# Patient Record
Sex: MALE | Race: OTHER | HISPANIC OR LATINO | URBAN - METROPOLITAN AREA
[De-identification: names, ages, dates, MRNs, and addresses within clinical notes are randomized per-mention and may not be internally consistent; named-entity substitution may affect disease eponyms.]

---

## 2017-10-07 ENCOUNTER — EMERGENCY (EMERGENCY)
Facility: HOSPITAL | Age: 22
LOS: 1 days | Discharge: HOME | End: 2017-10-07
Attending: EMERGENCY MEDICINE
Payer: MEDICAID

## 2017-10-07 VITALS
HEART RATE: 66 BPM | DIASTOLIC BLOOD PRESSURE: 72 MMHG | TEMPERATURE: 98 F | RESPIRATION RATE: 16 BRPM | SYSTOLIC BLOOD PRESSURE: 122 MMHG | OXYGEN SATURATION: 100 %

## 2017-10-07 VITALS
HEIGHT: 68 IN | HEART RATE: 58 BPM | DIASTOLIC BLOOD PRESSURE: 64 MMHG | OXYGEN SATURATION: 100 % | RESPIRATION RATE: 20 BRPM | TEMPERATURE: 98 F | WEIGHT: 199.96 LBS | SYSTOLIC BLOOD PRESSURE: 118 MMHG

## 2017-10-07 PROCEDURE — 12004 RPR S/N/AX/GEN/TRK7.6-12.5CM: CPT

## 2017-10-07 PROCEDURE — 12004 RPR S/N/AX/GEN/TRK7.6-12.5CM: CPT | Mod: RT

## 2017-10-07 PROCEDURE — 99284 EMERGENCY DEPT VISIT MOD MDM: CPT | Mod: 25

## 2017-10-07 PROCEDURE — 90715 TDAP VACCINE 7 YRS/> IM: CPT

## 2017-10-07 PROCEDURE — 99283 EMERGENCY DEPT VISIT LOW MDM: CPT | Mod: 25

## 2017-10-07 PROCEDURE — 90471 IMMUNIZATION ADMIN: CPT

## 2017-10-07 RX ORDER — IBUPROFEN 200 MG
1 TABLET ORAL
Qty: 20 | Refills: 0 | OUTPATIENT
Start: 2017-10-07

## 2017-10-07 RX ORDER — TETANUS TOXOID, REDUCED DIPHTHERIA TOXOID AND ACELLULAR PERTUSSIS VACCINE, ADSORBED 5; 2.5; 8; 8; 2.5 [IU]/.5ML; [IU]/.5ML; UG/.5ML; UG/.5ML; UG/.5ML
0.5 SUSPENSION INTRAMUSCULAR ONCE
Qty: 0 | Refills: 0 | Status: COMPLETED | OUTPATIENT
Start: 2017-10-07 | End: 2017-10-07

## 2017-10-07 RX ORDER — BACITRACIN ZINC 500 UNIT/G
1 OINTMENT IN PACKET (EA) TOPICAL
Qty: 1 | Refills: 0 | OUTPATIENT
Start: 2017-10-07 | End: 2017-10-12

## 2017-10-07 RX ADMIN — TETANUS TOXOID, REDUCED DIPHTHERIA TOXOID AND ACELLULAR PERTUSSIS VACCINE, ADSORBED 0.5 MILLILITER(S): 5; 2.5; 8; 8; 2.5 SUSPENSION INTRAMUSCULAR at 12:40

## 2017-10-07 NOTE — ED PROVIDER NOTE - MEDICAL DECISION MAKING DETAILS
Pt is neurovascularly intact sp suturing of hand laceration. Laceration is well approximated, pt/guardian refused plastics consult, aware of potential for scar formation.   Pt is well appearing walking with normal gait, stable for discharge and follow up with medical doctor. Pt educated on care and need for follow up. Discussed anticipatory guidance and return precautions. Questions answered. I had a detailed discussion with the patient and/or guardian regarding the historical points, exam findings, and any diagnostic results supporting the discharge diagnosis.. Pt will return in 10 days for suture removal.

## 2017-10-07 NOTE — ED PROVIDER NOTE - PHYSICAL EXAMINATION
9cm linear laceration from 2nd-3rd palmar web space extending down too proximal palmar region, distal radial and ulnar pulses intact, cap refill < 2 seconds, full range of motion of arm +elbow flexion/extension/supination and pronation intact, +flexion and extension of all digits at DIP and PIP.

## 2017-10-07 NOTE — ED PROCEDURE NOTE - PROCEDURE ADDITIONAL DETAILS
Laceration is well approximated, pt/guardian refused plastics consult, aware of potential for scar formation.

## 2017-10-07 NOTE — ED PROVIDER NOTE - OBJECTIVE STATEMENT
23 y/o M pt w/ no significant PMHx presents to the ED c/o laceration. Pt states that he was climbing a metal fence when he cut his hand. Denies numbness/tingling, weakness or any other complaints. NKDA.

## 2017-10-11 DIAGNOSIS — S61.411A LACERATION WITHOUT FOREIGN BODY OF RIGHT HAND, INITIAL ENCOUNTER: ICD-10-CM
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
## 2019-10-23 NOTE — ED ADULT TRIAGE NOTE - NS ED NOTE AC HIGH RISK COUNTRIES
What Type Of Note Output Would You Prefer (Optional)?: Standard Output Hpi Title: Evaluation of Skin Lesions Have Your Spot(S) Been Treated In The Past?: has not been treated Family Member: Father No

## 2020-03-16 ENCOUNTER — EMERGENCY (EMERGENCY)
Facility: HOSPITAL | Age: 25
LOS: 1 days | Discharge: HOME | End: 2020-03-16
Attending: EMERGENCY MEDICINE
Payer: COMMERCIAL

## 2020-03-16 VITALS
DIASTOLIC BLOOD PRESSURE: 83 MMHG | OXYGEN SATURATION: 99 % | HEIGHT: 68 IN | SYSTOLIC BLOOD PRESSURE: 134 MMHG | WEIGHT: 210.1 LBS | HEART RATE: 86 BPM | TEMPERATURE: 98 F | RESPIRATION RATE: 18 BRPM

## 2020-03-16 PROCEDURE — 99282 EMERGENCY DEPT VISIT SF MDM: CPT

## 2020-03-16 NOTE — ED PROVIDER NOTE - OBJECTIVE STATEMENT
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  Patient symptoms started 2 nights ago.  Patient states that he was sent in from his employer for COVID-19 testing.  Patient states that he is able to walk around, feels like he has a cold.  Denies travel / sick contacts.  No sob, no chest pain, no fevers.

## 2020-03-16 NOTE — ED PROVIDER NOTE - PATIENT PORTAL LINK FT
You can access the FollowMyHealth Patient Portal offered by [facility] by registering at the following website: [email or web address]. By joining [employer]’s FollowMyHealth portal, you will also be able to view your health information using other applications (apps) compatible with our system.

## 2020-03-16 NOTE — ED PROVIDER NOTE - CLINICAL SUMMARY MEDICAL DECISION MAKING FREE TEXT BOX
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  Patient symptoms started 2 nights ago.  Likely common cold. 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  14 day work note provided.

## 2020-03-16 NOTE — ED PROVIDER NOTE - ATTENDING CONTRIBUTION TO CARE
5 year old male no past medical history presents for cough with green sputum, sore throat and runny nose x two days , supportive care

## 2020-03-16 NOTE — ED PROVIDER NOTE - THROAT FINDINGS
no exudate/uvula midline/no vesicles/no tongue elevation, no peritonsillar abscess, no hoarse of muffled voice/NO VESICLES/ULCERS/TONSILLAR SWELLING/NO STRIDOR/THROAT RED

## 2020-03-16 NOTE — ED PROVIDER NOTE - NSFOLLOWUPINSTRUCTIONS_ED_ALL_ED_FT
Please return to ED for new, concerning or worrisome symptoms.  Please follow-up with your primary care provider in 1-2 days.    Cough    Coughing is a reflex that clears your throat and your airways. Coughing helps to heal and protect your lungs. It is normal to cough occasionally, but a cough that happens with other symptoms or lasts a long time may be a sign of a condition that needs treatment. Coughing may be caused by infections, asthma or COPD, smoking, postnasal drip, gastroesophageal reflux, as well as other medical conditions. Take medicines only as instructed by your health care provider. Avoid environments or triggers that causes you to cough at work or at home.    SEEK IMMEDIATE MEDICAL CARE IF YOU HAVE ANY OF THE FOLLOWING SYMPTOMS: coughing up blood, shortness of breath, rapid heart rate, chest pain, unexplained weight loss or night sweats.

## 2020-03-16 NOTE — ED ADULT NURSE NOTE - CAS ELECT INFOMATION PROVIDED
DC instructions/Patient seen treated and released in intake. See stat docs. No nursing intervention required.

## 2021-02-22 NOTE — ED PROVIDER NOTE - NS HIV RISK FACTOR YES
Detail Level: Detailed
Cpt Desired: 
Showing: branching hyphae
Koh Intro Text (From The.....): A KOH prep was ordered and evaluated from the
Koh Procedure Text (Tissue Harvesting Technique): A 15-blade scalpel was used to scrape the skin. The skin scrapings were placed on a glass slide, covered with a coverslip and a KOH solution was applied.
Declined

## 2022-04-08 NOTE — ED PROVIDER NOTE - CROS ED ROS STATEMENT
PATIENTS GFR IS 25 WILL NEED WAIVER OR SCANS ORDERED WO CONTRAST
all other ROS negative except as per HPI

## 2023-05-09 NOTE — ED PROVIDER NOTE - CONSTITUTIONAL, MLM
Patient is here for a Nurse Check.      Reviewed and verified Advanced Directives: No: Patient declined to create/provide document at this time     Is the patient on an active anti-cancer treatment plan?   Yes,   Regimen: GemCITAbine 900 mg/m2 D1, 8 every 21 days  Cycle/Day: C9/D7    Oral Chemotherapy: No    ECOG [05/09/23 1547]   ECOG Performance Status 1     Nursing Assessment:   A focused nursing assessment addressing the toxicity of chemotherapy was performed and the patient reports the following:    Anxiety/Depression/Insomnia: No new concerns. Overall pt. is feeling good today.  Pain: NO    Toxicity Assessment    Auditory/Ear  Assessment: Yes (Within Defined Limits)    Cardiac General  Assessment: Yes (w/ Exceptions to WDL)  Hypertension: Grade 1    Constitutional  Assessment: Yes (w/ Exceptions to WDL)  Fatigue: Grade 1    Dermatology/Skin  Assessment: Yes (Within Defined Limits)    Gastrointestinal  Assessment: Yes (Within Defined Limits)  Anorexia: None Present (Pt. states she has been trying to eat more protein (beef/chicken) and feels this has helped her feel stronger)    Hemorrhage/Bleeding  Assessment: Yes (Within Defined Limits)    Infection  Assessment: Yes (Within Defined Limits)    Lymphatics  Assessment: Yes (Within Defined Limits)    Musculoskeletal  Assessment: Yes (Within Defined Limits)    Neurology  Assessment: Yes (Within Defined Limits)    Pain  Assessment: Yes (Within Defined Limits)    Pulmonary/Upper Respiratory  Assessment: Yes (Within Defined Limits)    Genitourinary  Assessment: Yes (Within Defined Limits)      Is this patient status post Stem Cell Transplant? Mirlande Cohn NP is supervising clinician today.    Vitals:    05/09/23 1400   BP: 138/69   BP Location: RUE - Right upper extremity   Patient Position: Sitting   Cuff Size: Regular   Pulse: (!) 55   Resp: 16   Temp: 98.3 °F (36.8 °C)   TempSrc: Oral   SpO2: 99%      Central Line Care: See Invasive (Oncology) Lines  Flowsheet and MAR for CVAD documentation as appropriate    Transfusion: Not needed    Education: Patient Education: Reviewed side effects including: nausea and vomiting, diarrhea and constipation, mucositis, fluids and dehydration, appetite and taste changes, neuropathy, skin and nail changes, myelosuppression, fatigue, muscle, joint, bone pain, fever, antiemetics, tearing of eyes, bruising or bleeding and fluid retention    Next appointment scheduled:   Future Appointments   Date Time Provider Department Center   5/16/2023  1:15 PM [facility]   5/16/2023  1:30 PM [facility]5 TREATMENT CHAIR [facility]5 [facility]   5/30/2023 12:30 PM [facility]   5/30/2023  1:00 PM Jaosn Giles MD [facility]     Labs printed and reviewed with pt.     Patient instructed to call the office with any questions or concerns.    Patient Discharged: patient discharged to home per self, ambulatory   normal... Well appearing, well nourished, awake, alert, oriented to person, place, time/situation and in no apparent distress.

## 2024-06-11 NOTE — ED ADULT TRIAGE NOTE - TEMPERATURE IN CELSIUS (DEGREES C)
Follow-up with primary doctor soon as able for reevaluation.  Call them in the morning to set up follow-up appointment.  Return to emergency department for worsening uncontrolled pain lightness or dizziness any shortness of breath, fevers, nausea vomiting diarrhea, black or bloody stools, urinary symptoms or any new changing or worsening symptoms were always here for evaluation never hesitate to return I do want you to get a repeat potassium check a primary physician within the next week.  I did give you a muscle relaxer Robaxin.  I know you state that you do not believe you take tizanidine at home but I do want you to check your medicines at home to make sure you are not taking it as you should not take this with Robaxin.  
36.7

## 2025-03-27 NOTE — ED ADULT TRIAGE NOTE - WEIGHT IN LBS
199.9 You can access the FollowMyHealth Patient Portal offered by [facility] by registering at the following website: [email or web address]. By joining [employer]’s FollowMyHealth portal, you will also be able to view your health information using other applications (apps) compatible with our system.